# Patient Record
Sex: FEMALE | Race: WHITE | Employment: OTHER | URBAN - METROPOLITAN AREA
[De-identification: names, ages, dates, MRNs, and addresses within clinical notes are randomized per-mention and may not be internally consistent; named-entity substitution may affect disease eponyms.]

---

## 2017-01-04 ENCOUNTER — HOSPITAL ENCOUNTER (OUTPATIENT)
Dept: MAMMOGRAPHY | Age: 62
Discharge: HOME | End: 2017-01-04
Attending: INTERNAL MEDICINE
Payer: MEDICARE

## 2017-01-04 DIAGNOSIS — Z12.31 VISIT FOR SCREENING MAMMOGRAM: ICD-10-CM

## 2017-01-04 PROCEDURE — 77067 SCR MAMMO BI INCL CAD: CPT

## 2017-09-11 ENCOUNTER — TELEPHONE (OUTPATIENT)
Dept: INTERNAL MEDICINE CLINIC | Facility: CLINIC | Age: 62
End: 2017-09-11

## 2017-09-11 NOTE — TELEPHONE ENCOUNTER
Patient is eligible for a Annual Medicare Physical Exam. Please assist patient in scheduling appointment when patient returns call. Thank you. Left message to call back [phone].

## 2017-10-07 ENCOUNTER — TELEPHONE (OUTPATIENT)
Dept: INTERNAL MEDICINE CLINIC | Facility: CLINIC | Age: 62
End: 2017-10-07

## 2017-10-07 NOTE — TELEPHONE ENCOUNTER
Patient is eligible for a Annual Medicare Physical Exam. Please assist patient in scheduling appointment when patient returns call. Thank you. Left message to call back [phone].

## 2017-11-06 ENCOUNTER — TELEPHONE (OUTPATIENT)
Dept: INTERNAL MEDICINE CLINIC | Facility: CLINIC | Age: 62
End: 2017-11-06

## 2017-11-06 NOTE — TELEPHONE ENCOUNTER
Per BruceAMS [facility] from [facility] Dr. Sandra Capellan is not accepting any new patients. [facility] to reschedule appt. Please assist patient in rescheduling appointment scheduled for 11/9/2017. Thank you.

## 2017-11-09 ENCOUNTER — LAB ENCOUNTER (OUTPATIENT)
Dept: LAB | Age: 62
End: 2017-11-09
Attending: INTERNAL MEDICINE
Payer: MEDICARE

## 2017-11-09 ENCOUNTER — OFFICE VISIT (OUTPATIENT)
Dept: INTERNAL MEDICINE CLINIC | Facility: CLINIC | Age: 62
End: 2017-11-09

## 2017-11-09 VITALS
TEMPERATURE: 98 F | DIASTOLIC BLOOD PRESSURE: 79 MMHG | BODY MASS INDEX: 32.94 KG/M2 | HEIGHT: 62 IN | WEIGHT: 179 LBS | SYSTOLIC BLOOD PRESSURE: 125 MMHG | HEART RATE: 72 BPM

## 2017-11-09 DIAGNOSIS — G35 MULTIPLE SCLEROSIS (HCC): ICD-10-CM

## 2017-11-09 DIAGNOSIS — Z00.00 ENCOUNTER FOR ANNUAL HEALTH EXAMINATION: Primary | ICD-10-CM

## 2017-11-09 DIAGNOSIS — Z00.00 ENCOUNTER FOR ANNUAL HEALTH EXAMINATION: ICD-10-CM

## 2017-11-09 DIAGNOSIS — H25.13 AGE-RELATED NUCLEAR CATARACT OF BOTH EYES: ICD-10-CM

## 2017-11-09 DIAGNOSIS — Z13.6 SCREENING FOR CARDIOVASCULAR CONDITION: ICD-10-CM

## 2017-11-09 DIAGNOSIS — Z12.31 VISIT FOR SCREENING MAMMOGRAM: ICD-10-CM

## 2017-11-09 DIAGNOSIS — Z86.69 HISTORY OF OPTIC NEURITIS: ICD-10-CM

## 2017-11-09 PROCEDURE — G0008 ADMIN INFLUENZA VIRUS VAC: HCPCS | Performed by: INTERNAL MEDICINE

## 2017-11-09 PROCEDURE — 84443 ASSAY THYROID STIM HORMONE: CPT

## 2017-11-09 PROCEDURE — 80053 COMPREHEN METABOLIC PANEL: CPT

## 2017-11-09 PROCEDURE — 90686 IIV4 VACC NO PRSV 0.5 ML IM: CPT | Performed by: INTERNAL MEDICINE

## 2017-11-09 PROCEDURE — 80061 LIPID PANEL: CPT

## 2017-11-09 PROCEDURE — 85025 COMPLETE CBC W/AUTO DIFF WBC: CPT

## 2017-11-09 PROCEDURE — G0439 PPPS, SUBSEQ VISIT: HCPCS | Performed by: INTERNAL MEDICINE

## 2017-11-09 PROCEDURE — 36415 COLL VENOUS BLD VENIPUNCTURE: CPT

## 2017-11-09 PROCEDURE — 82306 VITAMIN D 25 HYDROXY: CPT

## 2017-11-09 PROCEDURE — 96160 PT-FOCUSED HLTH RISK ASSMT: CPT | Performed by: INTERNAL MEDICINE

## 2017-11-09 RX ORDER — MODAFINIL 100 MG/1
100 TABLET ORAL
Qty: 90 TABLET | Refills: 1 | Status: SHIPPED | OUTPATIENT
Start: 2017-11-09 | End: 2019-05-08

## 2017-11-09 NOTE — PROGRESS NOTES
HPI:   Kenia Caldwell is a 65year old female who presents for a Medicare Subsequent Annual Wellness visit (Pt already had Initial Annual Wellness).     Pt is a 66 yo woman comes for her medicare annual   Used to see dr Haddad Doing   Needs refill - 3 mn supply a INFORMATION:   She  has a past medical history of Age-related nuclear cataract of both eyes (12/15/2016); Cataract (2008); Disorder of eye, unspecified (2008); History of chicken pox; History of optic neuritis- right eye- June 2008 (12/15/2016);  Lipid scre following:    Height as of this encounter: 5' 2\" (1.575 m). Weight as of this encounter: 179 lb (81.2 kg).      GENERAL: well developed, well nourished,in no apparent distress, not using cane although she states she has one   SKIN: no rashes,no suspicio and friends have told me they think I may have hearing loss:  No            Visual Acuity  Right Eye Visual Acuity: Corrected Right Eye Chart Acuity: 20/25   Left Eye Visual Acuity: Corrected Left Eye Chart Acuity: 20/20   Both Eyes Visual Acuity: Correcte reviewed self breast exam and ordered mammogram   mammogram January 2017 normal colonoscopy  Done by Dr. Fredie Bumpers in 2010 next one in 2020  Flu shot today  Pap smear to be done by Dr. Juan M Arguello last one was 3 years ago-always normal  Labs from 2016 December No    Vision Problems? : No    Difficulty walking?: No    Difficulty dressing or bathing?: No    Problems with daily activities? : No    Memory Problems?: No      Fall/Risk Assessment     Do you have 3 or more medical conditions?: 0-No    Have you fallen i EKG - w/ Initial Preventative Physical Exam only, or if medically necessary Electrocardiogram date12/20/2016       Colorectal Cancer Screening      Colonoscopy Screen every 10 years Colonoscopy,10 Years due on 07/02/2020 Update Health Maintenance if applic No vaccine history found This may be covered with your prescription benefits, but Medicare does not cover unless Medically needed    Zoster  Not covered by Medicare Part B No vaccine history found This may be covered with your pharmacy  prescription paz

## 2017-11-09 NOTE — PATIENT INSTRUCTIONS
Kusum Saavedra's SCREENING SCHEDULE   Tests on this list are recommended by your physician but may not be covered, or covered at this frequency, by your insurer. Please check with your insurance carrier before scheduling to verify coverage.    PREVENTATIVE with a family history    Colorectal Cancer Screening  Covered up to Age 76     Colonoscopy Screen   Covered every 10 years- more often if abnormal Colonoscopy,10 Years due on 07/02/2020 Update Health Maintenance if applicable    Flex Sigmoidoscopy Screen found for this or any previous visit. Please get once after your 65th birthday    Pneumococcal 23 (Pneumovax)  Covered Once after 65 No orders found for this or any previous visit.  Please get once after your 65th birthday    Hepatitis B for Moderate/High R

## 2018-02-27 ENCOUNTER — HOSPITAL ENCOUNTER (OUTPATIENT)
Dept: MAMMOGRAPHY | Age: 63
Discharge: HOME | End: 2018-02-27
Attending: INTERNAL MEDICINE
Payer: MEDICARE

## 2018-02-27 DIAGNOSIS — Z12.31 VISIT FOR SCREENING MAMMOGRAM: ICD-10-CM

## 2018-02-27 PROCEDURE — 77067 SCR MAMMO BI INCL CAD: CPT | Performed by: INTERNAL MEDICINE

## 2018-07-11 ENCOUNTER — OFFICE VISIT (OUTPATIENT)
Dept: NEUROLOGY | Facility: CLINIC | Age: 63
End: 2018-07-11

## 2018-07-11 VITALS
BODY MASS INDEX: 31.94 KG/M2 | HEART RATE: 80 BPM | RESPIRATION RATE: 16 BRPM | SYSTOLIC BLOOD PRESSURE: 130 MMHG | WEIGHT: 178 LBS | HEIGHT: 62.5 IN | DIASTOLIC BLOOD PRESSURE: 78 MMHG

## 2018-07-11 DIAGNOSIS — G35 MULTIPLE SCLEROSIS (HCC): ICD-10-CM

## 2018-07-11 DIAGNOSIS — G82.20 PARAPARESIS (HCC): ICD-10-CM

## 2018-07-11 DIAGNOSIS — R27.0 ATAXIA: Primary | ICD-10-CM

## 2018-07-11 PROCEDURE — 99214 OFFICE O/P EST MOD 30 MIN: CPT | Performed by: OTHER

## 2018-07-11 RX ORDER — MODAFINIL 100 MG/1
100 TABLET ORAL DAILY
Qty: 90 TABLET | Refills: 3 | Status: SHIPPED | OUTPATIENT
Start: 2018-07-11 | End: 2019-09-04

## 2018-07-11 NOTE — PROGRESS NOTES
She believes neurologically she is stable for multiple sclerosis. Lower extremity weakness. She does not wish to go on any immunotherapy for multiple sclerosis.   I discussed the previous MRI scan of the cervical spine reviewed thyroid nodule, she decline

## 2018-07-17 ENCOUNTER — HOSPITAL ENCOUNTER (OUTPATIENT)
Dept: MRI IMAGING | Age: 63
Discharge: HOME | End: 2018-07-17
Attending: Other
Payer: MEDICARE

## 2018-07-17 ENCOUNTER — TELEPHONE (OUTPATIENT)
Dept: NEUROLOGY | Facility: CLINIC | Age: 63
End: 2018-07-17

## 2018-07-17 DIAGNOSIS — G82.20 PARAPARESIS (HCC): ICD-10-CM

## 2018-07-17 DIAGNOSIS — R27.0 ATAXIA: ICD-10-CM

## 2018-07-17 LAB — CREAT BLD-MCNC: 0.6 MG/DL (ref 0.5–1.5)

## 2018-07-17 PROCEDURE — A9576 INJ PROHANCE MULTIPACK: HCPCS | Performed by: OTHER

## 2018-07-17 PROCEDURE — 82565 ASSAY OF CREATININE: CPT

## 2018-07-17 PROCEDURE — 70553 MRI BRAIN STEM W/O & W/DYE: CPT | Performed by: OTHER

## 2018-07-17 PROCEDURE — 72156 MRI NECK SPINE W/O & W/DYE: CPT | Performed by: OTHER

## 2018-07-17 NOTE — TELEPHONE ENCOUNTER
Left MRI results in detailed message on patient's cell number per [facility] verbal release.

## 2018-07-17 NOTE — TELEPHONE ENCOUNTER
Can you please call the patient tell her that MRI of the brain, cervical spine are stable. No new lesions. This is good news.   Thank you

## 2018-07-25 ENCOUNTER — TELEPHONE (OUTPATIENT)
Dept: NEUROLOGY | Facility: CLINIC | Age: 63
End: 2018-07-25

## 2018-08-13 ENCOUNTER — PATIENT OUTREACH (OUTPATIENT)
Dept: CASE MANAGEMENT | Age: 63
End: 2018-08-13

## 2018-09-27 NOTE — PROGRESS NOTES
Second outreached to patient in regards to scheduling Medicare Annual exam (AHA). Left message for patient to return my call at ext. [phone]. Patient is eligible at this time. Thank you.

## 2018-10-19 NOTE — PROGRESS NOTES
Pt is eligible for 2018 Medicare Annual Health Assessment. After multiple attempts to reach patient by phone a letter was sent to patient requesting a call back to discuss.

## 2019-03-12 ENCOUNTER — TELEPHONE (OUTPATIENT)
Dept: CASE MANAGEMENT | Age: 64
End: 2019-03-12

## 2019-03-12 NOTE — TELEPHONE ENCOUNTER
Patient is eligible for a 2019 Annual Medicare Health Assessment. Left message to call back [phone].

## 2019-03-29 ENCOUNTER — TELEPHONE (OUTPATIENT)
Dept: CASE MANAGEMENT | Age: 64
End: 2019-03-29

## 2019-03-29 NOTE — TELEPHONE ENCOUNTER
Patient is eligible for a 2019 Annual Medicare Health Assessment. Left message to call back [phone]. Multiple outreaches to pt. Letter sent.

## 2019-05-08 ENCOUNTER — OFFICE VISIT (OUTPATIENT)
Dept: INTERNAL MEDICINE CLINIC | Facility: CLINIC | Age: 64
End: 2019-05-08
Payer: MEDICARE

## 2019-05-08 VITALS
HEIGHT: 62.5 IN | WEIGHT: 172 LBS | BODY MASS INDEX: 30.86 KG/M2 | DIASTOLIC BLOOD PRESSURE: 80 MMHG | HEART RATE: 76 BPM | SYSTOLIC BLOOD PRESSURE: 130 MMHG

## 2019-05-08 DIAGNOSIS — G82.20 PARAPARESIS (HCC): ICD-10-CM

## 2019-05-08 DIAGNOSIS — H25.13 AGE-RELATED NUCLEAR CATARACT OF BOTH EYES: ICD-10-CM

## 2019-05-08 DIAGNOSIS — G35 MULTIPLE SCLEROSIS (HCC): ICD-10-CM

## 2019-05-08 DIAGNOSIS — Z12.31 SCREENING MAMMOGRAM, ENCOUNTER FOR: ICD-10-CM

## 2019-05-08 DIAGNOSIS — Z11.59 NEED FOR HEPATITIS C SCREENING TEST: ICD-10-CM

## 2019-05-08 DIAGNOSIS — Z01.419 ENCOUNTER FOR ROUTINE GYNECOLOGICAL EXAMINATION WITH PAPANICOLAOU SMEAR OF CERVIX: ICD-10-CM

## 2019-05-08 DIAGNOSIS — Z86.69 HISTORY OF OPTIC NEURITIS: ICD-10-CM

## 2019-05-08 DIAGNOSIS — Z00.00 ENCOUNTER FOR ANNUAL HEALTH EXAMINATION: Primary | ICD-10-CM

## 2019-05-08 DIAGNOSIS — Z91.89 AT RISK FOR DECREASED BONE DENSITY: ICD-10-CM

## 2019-05-08 PROCEDURE — 96160 PT-FOCUSED HLTH RISK ASSMT: CPT | Performed by: INTERNAL MEDICINE

## 2019-05-08 PROCEDURE — 99396 PREV VISIT EST AGE 40-64: CPT | Performed by: INTERNAL MEDICINE

## 2019-05-08 PROCEDURE — G0439 PPPS, SUBSEQ VISIT: HCPCS | Performed by: INTERNAL MEDICINE

## 2019-05-08 NOTE — PROGRESS NOTES
HPI:   Kenia Caldwell is a 62year old female who presents for a MA (Medicare Advantage) Supervisit (Once per calendar year).     Patient is here for her Medicare annual wellness visit she has not been here for a while because  She has been busy with family Use      Smoking status: Former Smoker        Packs/day: 0.30        Years: 15.00        Pack years: 4.5        Types: Cigarettes        Quit date: 2008        Years since quittin.3      Smokeless tobacco: Never Used      Tobacco comment: day she Multiple Vitamins-Minerals (MULTI-VITAMIN/MINERALS) Oral Tab Take 1 tablet by mouth daily.       MEDICAL INFORMATION:   She  has a past medical history of Age-related nuclear cataract of both eyes (12/15/2016), Cataract (2008), Disorder of eye, unspecifie from the following:    Height as of this encounter: 5' 2.5\" (1.588 m). Weight as of this encounter: 172 lb (78 kg).     Medicare Hearing Assessment  (Required for AWV/SWV)    Hearing Screening    Time taken:  5/8/2019 12:57 PM  Screening Method:  Marine Lat sinus tenderness  NECK: supple,no adenopathy, nontender  LUNGS: clear to auscultation, no wheeze  CARDIO: RRR without murmur  GI: good BS's,no masses or tenderness  EXTREMITIES: no cyanosis, or edema  BREAST: Bilateral breasts without any lumps, bilateral in 2020  Pap smear to be done by Dr. Gurvinder Wilde last one was over 3 years ago-would not to be referred to a dr brian Noonan Jesse from 2017 reviewed and ordered new ones      Diet assessment: fair     PLAN:  The patient indicates understanding of these issues and Bone Density Screening      Dexascan Every two years No results found for this or any previous visit. No flowsheet data found.     Pap and Pelvic      Pap: Every 3 yrs age 21-65 or Pap+HPV every 5 yrs age 33-67, age 72 and older at high risk Pap Smear,3

## 2019-05-08 NOTE — PATIENT INSTRUCTIONS
Kusum Saavedra's SCREENING SCHEDULE   Tests on this list are recommended by your physician but may not be covered, or covered at this frequency, by your insurer. Please check with your insurance carrier before scheduling to verify coverage.    PREVENTATIVE Colorectal Cancer Screening  Covered up to Age 76     Colonoscopy Screen   Covered every 10 years- more often if abnormal Colonoscopy due on 07/02/2020 Update Health Maintenance if applicable    Flex Sigmoidoscopy Screen  Covered every 5 years No results Please get once after your 65th birthday    Pneumococcal 23 (Pneumovax)  Covered Once after 65 No orders found for this or any previous visit.  Please get once after your 65th birthday    Hepatitis B for Moderate/High Risk       No orders found for this or

## 2019-06-03 ENCOUNTER — LAB ENCOUNTER (OUTPATIENT)
Dept: LAB | Age: 64
End: 2019-06-03
Attending: INTERNAL MEDICINE
Payer: MEDICARE

## 2019-06-03 DIAGNOSIS — Z11.59 NEED FOR HEPATITIS C SCREENING TEST: ICD-10-CM

## 2019-06-03 DIAGNOSIS — Z00.00 ENCOUNTER FOR ANNUAL HEALTH EXAMINATION: ICD-10-CM

## 2019-06-03 PROCEDURE — 84443 ASSAY THYROID STIM HORMONE: CPT

## 2019-06-03 PROCEDURE — 85025 COMPLETE CBC W/AUTO DIFF WBC: CPT

## 2019-06-03 PROCEDURE — 80053 COMPREHEN METABOLIC PANEL: CPT

## 2019-06-03 PROCEDURE — 86803 HEPATITIS C AB TEST: CPT

## 2019-06-03 PROCEDURE — 80061 LIPID PANEL: CPT

## 2019-06-03 PROCEDURE — 36415 COLL VENOUS BLD VENIPUNCTURE: CPT

## 2019-06-13 ENCOUNTER — HOSPITAL ENCOUNTER (OUTPATIENT)
Dept: MAMMOGRAPHY | Age: 64
Discharge: HOME | End: 2019-06-13
Attending: INTERNAL MEDICINE
Payer: MEDICARE

## 2019-06-13 ENCOUNTER — HOSPITAL ENCOUNTER (OUTPATIENT)
Dept: BONE DENSITY | Age: 64
Discharge: HOME | End: 2019-06-13
Attending: INTERNAL MEDICINE
Payer: MEDICARE

## 2019-06-13 DIAGNOSIS — Z91.89 AT RISK FOR DECREASED BONE DENSITY: ICD-10-CM

## 2019-06-13 DIAGNOSIS — Z12.31 SCREENING MAMMOGRAM, ENCOUNTER FOR: ICD-10-CM

## 2019-06-13 PROCEDURE — 77063 BREAST TOMOSYNTHESIS BI: CPT | Performed by: INTERNAL MEDICINE

## 2019-06-13 PROCEDURE — 77080 DXA BONE DENSITY AXIAL: CPT | Performed by: INTERNAL MEDICINE

## 2019-06-13 PROCEDURE — 77067 SCR MAMMO BI INCL CAD: CPT | Performed by: INTERNAL MEDICINE

## 2019-06-15 DIAGNOSIS — Z80.3 FAMILY HISTORY OF BREAST CANCER IN FIRST DEGREE RELATIVE: Primary | ICD-10-CM

## 2019-06-21 ENCOUNTER — TELEPHONE (OUTPATIENT)
Dept: HEMATOLOGY/ONCOLOGY | Facility: HOSPITAL | Age: 64
End: 2019-06-21

## 2019-07-01 ENCOUNTER — HOSPITAL ENCOUNTER (OUTPATIENT)
Dept: MAMMOGRAPHY | Facility: HOSPITAL | Age: 64
Discharge: HOME | End: 2019-07-01
Attending: INTERNAL MEDICINE
Payer: MEDICARE

## 2019-07-01 DIAGNOSIS — R92.8 ABNORMAL MAMMOGRAM: ICD-10-CM

## 2019-07-01 PROCEDURE — 77065 DX MAMMO INCL CAD UNI: CPT | Performed by: INTERNAL MEDICINE

## 2019-07-01 PROCEDURE — 77061 BREAST TOMOSYNTHESIS UNI: CPT | Performed by: INTERNAL MEDICINE

## 2019-07-26 ENCOUNTER — TELEPHONE (OUTPATIENT)
Dept: HEMATOLOGY/ONCOLOGY | Facility: HOSPITAL | Age: 64
End: 2019-07-26

## 2019-08-29 ENCOUNTER — OFFICE VISIT (OUTPATIENT)
Dept: OBGYN CLINIC | Facility: CLINIC | Age: 64
End: 2019-08-29

## 2019-08-29 VITALS
SYSTOLIC BLOOD PRESSURE: 131 MMHG | BODY MASS INDEX: 31.58 KG/M2 | HEIGHT: 62.5 IN | DIASTOLIC BLOOD PRESSURE: 86 MMHG | HEART RATE: 80 BPM | WEIGHT: 176 LBS

## 2019-08-29 DIAGNOSIS — Z01.419 ENCOUNTER FOR GYNECOLOGICAL EXAMINATION WITHOUT ABNORMAL FINDING: Primary | ICD-10-CM

## 2019-08-29 PROCEDURE — G0101 CA SCREEN;PELVIC/BREAST EXAM: HCPCS | Performed by: OBSTETRICS & GYNECOLOGY

## 2019-08-29 NOTE — PROGRESS NOTES
Sean Phillips is a 62year old female  No LMP recorded. (Menstrual status: Menopause). who presents for Patient presents with:  Gyn Exam: NP, Annual    She has no complaints. She has not had intercourse in over 7 years.   We discussed ASCCP guideline and Sexual Activity      Alcohol use: Yes        Comment: 2-3 glasses of wine/week      Drug use: No      Sexual activity: Not on file    Lifestyle      Physical activity:        Days per week: Not on file        Minutes per session: Not on file      Stres Carbonate-Vitamin D (CALTRATE 600+D OR), Take 1 tablet by mouth 2 (two) times daily. , Disp: , Rfl:   •  Biotin 2.5 MG Oral Tab, Take 1 tablet by mouth.  , Disp: , Rfl:   •  Cholecalciferol (VITAMIN D3) 2000 UNITS Oral Tab, Take 1 tablet by mouth.  , Disp: Oriented to time, place, person and situation.  Appropriate mood and affect    Pelvic Exam:  External Genitalia: normal appearance, hair distribution, and no lesions  Urethral Meatus:  normal in size, location, without lesions and prolapse  Bladder:  No fu

## 2019-08-30 LAB — HPV I/H RISK 1 DNA SPEC QL NAA+PROBE: POSITIVE

## 2019-09-04 ENCOUNTER — OFFICE VISIT (OUTPATIENT)
Dept: NEUROLOGY | Facility: CLINIC | Age: 64
End: 2019-09-04
Payer: MEDICARE

## 2019-09-04 VITALS
HEIGHT: 62 IN | SYSTOLIC BLOOD PRESSURE: 132 MMHG | DIASTOLIC BLOOD PRESSURE: 86 MMHG | WEIGHT: 174 LBS | BODY MASS INDEX: 32.02 KG/M2

## 2019-09-04 DIAGNOSIS — G35 MULTIPLE SCLEROSIS (HCC): ICD-10-CM

## 2019-09-04 DIAGNOSIS — D51.8 OTHER VITAMIN B12 DEFICIENCY ANEMIA: Primary | ICD-10-CM

## 2019-09-04 PROCEDURE — 99213 OFFICE O/P EST LOW 20 MIN: CPT | Performed by: OTHER

## 2019-09-04 RX ORDER — MODAFINIL 100 MG/1
100 TABLET ORAL DAILY
Qty: 90 TABLET | Refills: 1 | Status: SHIPPED | OUTPATIENT
Start: 2019-09-04 | End: 2020-09-15

## 2019-09-04 NOTE — PROGRESS NOTES
Ms Hamlet Sabina, relates no new neurological symptoms. During a gyne procedure developed low back pain. This is improved. She denies lower extremity radicular pain.   She relates she will follow-up with her ophthalmologist.    MRI reports of that cervical spin

## 2019-09-05 ENCOUNTER — TELEPHONE (OUTPATIENT)
Dept: NEUROLOGY | Facility: CLINIC | Age: 64
End: 2019-09-05

## 2019-09-05 ENCOUNTER — APPOINTMENT (OUTPATIENT)
Dept: LAB | Age: 64
End: 2019-09-05
Attending: Other
Payer: MEDICARE

## 2019-09-05 DIAGNOSIS — D51.8 OTHER VITAMIN B12 DEFICIENCY ANEMIA: ICD-10-CM

## 2019-09-05 LAB
HPV16 DNA CVX QL PROBE+SIG AMP: POSITIVE
HPV18 DNA CVX QL PROBE+SIG AMP: NEGATIVE

## 2019-09-05 PROCEDURE — 82306 VITAMIN D 25 HYDROXY: CPT

## 2019-09-05 PROCEDURE — 82607 VITAMIN B-12: CPT

## 2019-09-05 PROCEDURE — 36415 COLL VENOUS BLD VENIPUNCTURE: CPT

## 2019-09-05 NOTE — TELEPHONE ENCOUNTER
[employer] mailorder calling to ask questions for PA for Modafinal.    PA questions answered and determination will be faxed to the office

## 2019-09-11 ENCOUNTER — MED REC SCAN ONLY (OUTPATIENT)
Dept: NEUROLOGY | Facility: CLINIC | Age: 64
End: 2019-09-11

## 2019-09-18 ENCOUNTER — TELEPHONE (OUTPATIENT)
Dept: OBGYN CLINIC | Facility: CLINIC | Age: 64
End: 2019-09-18

## 2019-09-18 NOTE — TELEPHONE ENCOUNTER
----- Message from Jaquan Wright MD sent at 9/11/2019  6:34 PM CDT -----  HPV 16 positive- please schedule colposcopy and explain procedure

## 2019-09-19 NOTE — TELEPHONE ENCOUNTER
Informed pt of results and CAP recs below. Pt became very tearful on the phone. Pt states   \"that pap smear was the most difficult and very painful and so uncomfortable\". Pt states \"I can't go through that again. Dr. Matthew Ross will understand\".  Pt asking to

## 2019-09-20 NOTE — TELEPHONE ENCOUNTER
Notified pt of CAP's recs below. Pt states she does not have anyone to drive her. Pt will think about it and call us back. Staff message sent to f/u.

## 2019-09-20 NOTE — TELEPHONE ENCOUNTER
Unfortunately I cannot change the recs based on discomfort- I have to follow the ASCCP guidelines for her abnormal pap. We can try to clear with her insurance to have the colpo done under anesthesia but insurance may not be willing to cover this.   We can

## 2019-10-17 ENCOUNTER — OFFICE VISIT (OUTPATIENT)
Dept: OPHTHALMOLOGY | Facility: CLINIC | Age: 64
End: 2019-10-17
Payer: MEDICARE

## 2019-10-17 DIAGNOSIS — Z86.69 HISTORY OF OPTIC NEURITIS: ICD-10-CM

## 2019-10-17 DIAGNOSIS — H43.393 FLOATER, VITREOUS, BILATERAL: ICD-10-CM

## 2019-10-17 DIAGNOSIS — H25.13 AGE-RELATED NUCLEAR CATARACT OF BOTH EYES: Primary | ICD-10-CM

## 2019-10-17 DIAGNOSIS — G35 MULTIPLE SCLEROSIS (HCC): ICD-10-CM

## 2019-10-17 PROCEDURE — 92015 DETERMINE REFRACTIVE STATE: CPT | Performed by: OPHTHALMOLOGY

## 2019-10-17 PROCEDURE — 92014 COMPRE OPH EXAM EST PT 1/>: CPT | Performed by: OPHTHALMOLOGY

## 2019-10-17 NOTE — ASSESSMENT & PLAN NOTE
Discussed moderate cataracts with patient. No treatment recommended at this time. New glasses today; update as needed. Discussed that new prescription is only a slight change.

## 2019-10-17 NOTE — PATIENT INSTRUCTIONS
History of optic neuritis- right eye- June 2008  Patient had optic neuritis in the right eye in 2008. Age-related nuclear cataract of both eyes  Discussed moderate cataracts with patient. No treatment recommended at this time.  New glasses today; updat

## 2019-10-17 NOTE — PROGRESS NOTES
Lacretibryant Ny is a 62year old female. HPI:     HPI     Pt complains of blurred vision OU at distance and near with her glasses and would like an updated Rx. Pt has no eye pain and is not using any eye drops. Dx with MS in 2008.      Last edited by [employer] MG Oral Tab, Take 1 tablet by mouth.  , Disp: , Rfl:   Cholecalciferol (VITAMIN D3) 2000 UNITS Oral Tab, Take 1 tablet by mouth.  , Disp: , Rfl:   Omega-3 Fatty Acids (FISH OIL OR), Take 1 capsule by mouth.  , Disp: , Rfl:   Multiple Vitamins-Minerals (MUL +0.50 130 1.75    Type:  Progressive bifocal          Manifest Refraction       Sphere Cylinder [place] Add [place]    Right -0.50 +1.00 180 20/25+ +2.25 20/20    Left +0.50 +0.50 130 20/20 +2.25 20/20          Final Rx       Sphere Cylinder Axis Dist

## 2020-02-03 ENCOUNTER — TELEPHONE (OUTPATIENT)
Dept: CASE MANAGEMENT | Age: 65
End: 2020-02-03

## 2020-02-28 ENCOUNTER — MA CHART PREP (OUTPATIENT)
Dept: FAMILY MEDICINE CLINIC | Facility: CLINIC | Age: 65
End: 2020-02-28

## 2020-02-28 PROBLEM — Z87.891 PAST USE OF TOBACCO: Status: ACTIVE | Noted: 2020-02-28

## 2020-02-28 PROBLEM — E04.1 THYROID NODULE: Status: ACTIVE | Noted: 2020-02-28

## 2020-02-28 PROBLEM — M48.02 NEURAL FORAMINAL STENOSIS OF CERVICAL SPINE: Status: ACTIVE | Noted: 2020-02-28

## 2020-03-02 ENCOUNTER — LAB ENCOUNTER (OUTPATIENT)
Dept: LAB | Age: 65
End: 2020-03-02
Attending: INTERNAL MEDICINE
Payer: MEDICARE

## 2020-03-02 ENCOUNTER — OFFICE VISIT (OUTPATIENT)
Dept: INTERNAL MEDICINE CLINIC | Facility: CLINIC | Age: 65
End: 2020-03-02
Payer: MEDICARE

## 2020-03-02 VITALS
HEIGHT: 62 IN | BODY MASS INDEX: 30.73 KG/M2 | HEART RATE: 67 BPM | WEIGHT: 167 LBS | SYSTOLIC BLOOD PRESSURE: 132 MMHG | DIASTOLIC BLOOD PRESSURE: 88 MMHG

## 2020-03-02 DIAGNOSIS — M81.0 AGE-RELATED OSTEOPOROSIS WITHOUT CURRENT PATHOLOGICAL FRACTURE: ICD-10-CM

## 2020-03-02 DIAGNOSIS — Z00.00 ENCOUNTER FOR ANNUAL HEALTH EXAMINATION: ICD-10-CM

## 2020-03-02 DIAGNOSIS — Z86.69 HISTORY OF OPTIC NEURITIS: ICD-10-CM

## 2020-03-02 DIAGNOSIS — G35 MULTIPLE SCLEROSIS (HCC): ICD-10-CM

## 2020-03-02 DIAGNOSIS — G82.20 PARAPARESIS (HCC): ICD-10-CM

## 2020-03-02 DIAGNOSIS — R87.618 ABNORMAL PAPANICOLAOU SMEAR OF CERVIX WITH POSITIVE HUMAN PAPILLOMA VIRUS (HPV) TEST: ICD-10-CM

## 2020-03-02 DIAGNOSIS — E04.1 THYROID NODULE: ICD-10-CM

## 2020-03-02 DIAGNOSIS — Z12.31 VISIT FOR SCREENING MAMMOGRAM: ICD-10-CM

## 2020-03-02 DIAGNOSIS — M48.02 NEURAL FORAMINAL STENOSIS OF CERVICAL SPINE: ICD-10-CM

## 2020-03-02 DIAGNOSIS — Z00.00 ENCOUNTER FOR ANNUAL HEALTH EXAMINATION: Primary | ICD-10-CM

## 2020-03-02 DIAGNOSIS — Z12.11 COLON CANCER SCREENING: ICD-10-CM

## 2020-03-02 DIAGNOSIS — H25.13 AGE-RELATED NUCLEAR CATARACT OF BOTH EYES: ICD-10-CM

## 2020-03-02 DIAGNOSIS — H43.393 FLOATER, VITREOUS, BILATERAL: ICD-10-CM

## 2020-03-02 DIAGNOSIS — Z87.891 PAST USE OF TOBACCO: ICD-10-CM

## 2020-03-02 DIAGNOSIS — Z80.3 FH: BREAST CANCER IN FIRST DEGREE RELATIVE WHEN <50 YEARS OLD: ICD-10-CM

## 2020-03-02 LAB
ALBUMIN SERPL-MCNC: 3.8 G/DL (ref 3.4–5)
ALBUMIN/GLOB SERPL: 1.3 {RATIO} (ref 1–2)
ALP LIVER SERPL-CCNC: 67 U/L (ref 50–130)
ALT SERPL-CCNC: 24 U/L (ref 13–56)
ANION GAP SERPL CALC-SCNC: 5 MMOL/L (ref 0–18)
AST SERPL-CCNC: 17 U/L (ref 15–37)
BILIRUB SERPL-MCNC: 0.6 MG/DL (ref 0.1–2)
BUN BLD-MCNC: 10 MG/DL (ref 7–18)
BUN/CREAT SERPL: 15.9 (ref 10–20)
CALCIUM BLD-MCNC: 9.3 MG/DL (ref 8.5–10.1)
CHLORIDE SERPL-SCNC: 108 MMOL/L (ref 98–112)
CHOLEST SMN-MCNC: 226 MG/DL (ref ?–200)
CO2 SERPL-SCNC: 28 MMOL/L (ref 21–32)
CREAT BLD-MCNC: 0.63 MG/DL (ref 0.55–1.02)
GLOBULIN PLAS-MCNC: 3 G/DL (ref 2.8–4.4)
GLUCOSE BLD-MCNC: 85 MG/DL (ref 70–99)
HDLC SERPL-MCNC: 66 MG/DL (ref 40–59)
LDLC SERPL CALC-MCNC: 140 MG/DL (ref ?–100)
M PROTEIN MFR SERPL ELPH: 6.8 G/DL (ref 6.4–8.2)
NONHDLC SERPL-MCNC: 160 MG/DL (ref ?–130)
OSMOLALITY SERPL CALC.SUM OF ELEC: 290 MOSM/KG (ref 275–295)
PATIENT FASTING Y/N/NP: YES
PATIENT FASTING Y/N/NP: YES
POTASSIUM SERPL-SCNC: 4.7 MMOL/L (ref 3.5–5.1)
SODIUM SERPL-SCNC: 141 MMOL/L (ref 136–145)
TRIGL SERPL-MCNC: 98 MG/DL (ref 30–149)
VLDLC SERPL CALC-MCNC: 20 MG/DL (ref 0–30)

## 2020-03-02 PROCEDURE — 99396 PREV VISIT EST AGE 40-64: CPT | Performed by: INTERNAL MEDICINE

## 2020-03-02 PROCEDURE — G0439 PPPS, SUBSEQ VISIT: HCPCS | Performed by: INTERNAL MEDICINE

## 2020-03-02 PROCEDURE — G0008 ADMIN INFLUENZA VIRUS VAC: HCPCS | Performed by: INTERNAL MEDICINE

## 2020-03-02 PROCEDURE — 96160 PT-FOCUSED HLTH RISK ASSMT: CPT | Performed by: INTERNAL MEDICINE

## 2020-03-02 PROCEDURE — 80061 LIPID PANEL: CPT

## 2020-03-02 PROCEDURE — 36415 COLL VENOUS BLD VENIPUNCTURE: CPT

## 2020-03-02 PROCEDURE — 80053 COMPREHEN METABOLIC PANEL: CPT

## 2020-03-02 PROCEDURE — 90686 IIV4 VACC NO PRSV 0.5 ML IM: CPT | Performed by: INTERNAL MEDICINE

## 2020-03-02 RX ORDER — ALENDRONATE SODIUM 70 MG/1
70 TABLET ORAL WEEKLY
Qty: 12 TABLET | Refills: 3 | Status: SHIPPED | OUTPATIENT
Start: 2020-03-02 | End: 2021-01-18

## 2020-03-02 NOTE — PATIENT INSTRUCTIONS
Kusum Saavedra's SCREENING SCHEDULE   Tests on this list are recommended by your physician but may not be covered, or covered at this frequency, by your insurer. Please check with your insurance carrier before scheduling to verify coverage.    PREVENTATIVE abnormal Colonoscopy due on 07/02/2020 Update [facility] if applicable    Flex Sigmoidoscopy Screen  Covered every 5 years No results found for this or any previous visit. No flowsheet data found.      Fecal Occult Blood   Covered Annually No result No orders found for this or any previous visit. Please get once after your 65th birthday    Hepatitis B for Moderate/High Risk       No orders found for this or any previous visit.  Medium/high risk factors:   End-stage renal disease   Hemophiliacs who rece

## 2020-03-02 NOTE — PROGRESS NOTES
HPI:   Bienvenido Carter is a 60year old female who presents for a MA (Medicare Advantage) Supervisit (Once per calendar year).     Patient is here for her Medicare physical  No complaints but has questions needs referral to see neurology and blood work-    H smoked tobacco in the past but quit greater than 12 months ago.   Social History    Tobacco Use      Smoking status: Former Smoker        Packs/day: 0.30        Years: 15.00        Pack years: 4.5        Types: Cigarettes        Quit date: 1/1/2008        Jennifer Guzman mouth once a week.  modafinil 100 MG Oral Tab, Take 1 tablet (100 mg total) by mouth daily. aspirin 81 MG Oral Tab, Take 81 mg by mouth daily. Calcium Carbonate-Vitamin D (CALTRATE 600+D OR), Take 1 tablet by mouth 2 (two) times daily.   Biotin 2.5 MG O incontinence   MUSCULOSKELETAL: denies back pain-- chronic -- has been moving   NEURO: denies headaches  PSYCHE: denies depression or anxiety  HEMATOLOGIC: denies hx of anemia  ENDOCRINE: denies thyroid history  ALL/ASTHMA: denies hx of allergy or asthma testing is covered by her insurance–she has the number and the order already  Past use of tobacco  Quit times many years  Paraparesis ([facility] [address]    Multiple sclerosis ([facility] [address]  -     NEURO - INTERNAL  Will refer to neurology  Thyroid nodule  -     US THYROID (CPT= maintain positive mental well-being?: (P) Social Interaction      This section provided for quick review of chart, separate sheet to patient  [address] Internal Lab or Procedure External Lab or Procedure   Diabetes Scr Pneumococcal 23 (Pneumovax)  Covered Once after 65 No vaccine history found Please get once after your 65th birthday    Hepatitis B for Moderate/High Risk No vaccine history found Medium/high risk factors:   End-stage renal disease   Hemophiliacs who recei

## 2020-07-21 ENCOUNTER — MED REC SCAN ONLY (OUTPATIENT)
Dept: INTERNAL MEDICINE CLINIC | Facility: CLINIC | Age: 65
End: 2020-07-21

## 2020-08-20 ENCOUNTER — HOSPITAL ENCOUNTER (OUTPATIENT)
Dept: MAMMOGRAPHY | Age: 65
Discharge: HOME | End: 2020-08-20
Attending: INTERNAL MEDICINE
Payer: MEDICARE

## 2020-08-20 DIAGNOSIS — Z12.31 VISIT FOR SCREENING MAMMOGRAM: ICD-10-CM

## 2020-08-20 PROCEDURE — 77067 SCR MAMMO BI INCL CAD: CPT | Performed by: INTERNAL MEDICINE

## 2020-08-20 PROCEDURE — 77063 BREAST TOMOSYNTHESIS BI: CPT | Performed by: INTERNAL MEDICINE

## 2020-09-08 ENCOUNTER — OFFICE VISIT (OUTPATIENT)
Dept: INTERNAL MEDICINE CLINIC | Facility: CLINIC | Age: 65
End: 2020-09-08
Payer: MEDICARE

## 2020-09-08 VITALS
SYSTOLIC BLOOD PRESSURE: 137 MMHG | DIASTOLIC BLOOD PRESSURE: 86 MMHG | WEIGHT: 170.63 LBS | HEART RATE: 69 BPM | TEMPERATURE: 97 F | BODY MASS INDEX: 31.4 KG/M2 | HEIGHT: 62 IN

## 2020-09-08 DIAGNOSIS — G35 MULTIPLE SCLEROSIS (HCC): Primary | ICD-10-CM

## 2020-09-08 DIAGNOSIS — R87.629 ABNORMAL VAGINAL PAP SMEAR: ICD-10-CM

## 2020-09-08 DIAGNOSIS — M81.0 AGE-RELATED OSTEOPOROSIS WITHOUT CURRENT PATHOLOGICAL FRACTURE: ICD-10-CM

## 2020-09-08 PROCEDURE — 99213 OFFICE O/P EST LOW 20 MIN: CPT | Performed by: INTERNAL MEDICINE

## 2020-09-08 PROCEDURE — 3079F DIAST BP 80-89 MM HG: CPT | Performed by: INTERNAL MEDICINE

## 2020-09-08 PROCEDURE — 3008F BODY MASS INDEX DOCD: CPT | Performed by: INTERNAL MEDICINE

## 2020-09-08 PROCEDURE — 3075F SYST BP GE 130 - 139MM HG: CPT | Performed by: INTERNAL MEDICINE

## 2020-09-08 NOTE — PROGRESS NOTES
Maricel Enciso is a 60year old female. Patient presents with: Follow - Up: 6m f/u      HPI:   Pt comes for f/u  C/c f/u  C/o hasnt been talking to her family -- \"I always have to be the one to reach out .  I have 2 girlfriends that talk to \"  No kids both eyes 12/15/2016   • Cataract 2008    OU   • Disorder of eye, unspecified 2008    altitudinal VF defect - OD; resolved 1/6/09   • History of chicken pox     /measles/mumps   • History of optic neuritis- right eye- June 2008 12/15/2016   • Multiple scle this visit:    Multiple sclerosis ([facility] [address]  F/u with neuro   Will check labs   Age-related osteoporosis without current pathological fracture  Cont with meds   Abnormal vaginal Pap smear  -     OBG - INTERNAL  will refer        Preventative medicine   reviewe

## 2020-09-15 ENCOUNTER — OFFICE VISIT (OUTPATIENT)
Dept: NEUROLOGY | Facility: CLINIC | Age: 65
End: 2020-09-15
Payer: MEDICARE

## 2020-09-15 VITALS
HEIGHT: 62 IN | DIASTOLIC BLOOD PRESSURE: 76 MMHG | WEIGHT: 172 LBS | SYSTOLIC BLOOD PRESSURE: 126 MMHG | BODY MASS INDEX: 31.65 KG/M2

## 2020-09-15 DIAGNOSIS — G35 MULTIPLE SCLEROSIS (HCC): Primary | ICD-10-CM

## 2020-09-15 PROCEDURE — 99213 OFFICE O/P EST LOW 20 MIN: CPT | Performed by: OTHER

## 2020-09-15 PROCEDURE — 3078F DIAST BP <80 MM HG: CPT | Performed by: OTHER

## 2020-09-15 PROCEDURE — 3008F BODY MASS INDEX DOCD: CPT | Performed by: OTHER

## 2020-09-15 PROCEDURE — 3074F SYST BP LT 130 MM HG: CPT | Performed by: OTHER

## 2020-09-15 RX ORDER — MODAFINIL 100 MG/1
100 TABLET ORAL DAILY
Qty: 90 TABLET | Refills: 1 | Status: SHIPPED | OUTPATIENT
Start: 2020-09-15

## 2020-09-15 NOTE — PROGRESS NOTES
Modafinil prescription called into [facility] at [facility] in [facility] per pt request. Printed prescription has been shredded.

## 2020-09-15 NOTE — PROGRESS NOTES
Ms Monica Cavazos, relates that she feels great. No trouble with balance. She is moved into assisted living, [facility] [place]. She denies headache, neck pain, low back pain. No focal motor, sensory symptoms in the arms or legs.     Review of systems -12 ye

## 2020-10-26 ENCOUNTER — MED REC SCAN ONLY (OUTPATIENT)
Dept: INTERNAL MEDICINE CLINIC | Facility: CLINIC | Age: 65
End: 2020-10-26

## 2020-10-26 ENCOUNTER — IMMUNIZATION (OUTPATIENT)
Dept: INTERNAL MEDICINE CLINIC | Facility: CLINIC | Age: 65
End: 2020-10-26
Payer: MEDICARE

## 2020-10-26 DIAGNOSIS — Z23 NEED FOR VACCINATION: ICD-10-CM

## 2020-10-26 PROCEDURE — G0008 ADMIN INFLUENZA VIRUS VAC: HCPCS | Performed by: INTERNAL MEDICINE

## 2020-10-26 PROCEDURE — 90686 IIV4 VACC NO PRSV 0.5 ML IM: CPT | Performed by: INTERNAL MEDICINE

## 2021-01-04 ENCOUNTER — LAB ENCOUNTER (OUTPATIENT)
Dept: LAB | Age: 66
End: 2021-01-04
Attending: INTERNAL MEDICINE
Payer: MEDICARE

## 2021-01-04 DIAGNOSIS — M81.0 AGE-RELATED OSTEOPOROSIS WITHOUT CURRENT PATHOLOGICAL FRACTURE: ICD-10-CM

## 2021-01-04 DIAGNOSIS — G35 MULTIPLE SCLEROSIS (HCC): ICD-10-CM

## 2021-01-04 LAB
ALBUMIN SERPL-MCNC: 3.6 G/DL (ref 3.4–5)
ALBUMIN/GLOB SERPL: 1.1 {RATIO} (ref 1–2)
ALP LIVER SERPL-CCNC: 54 U/L
ALT SERPL-CCNC: 18 U/L
ANION GAP SERPL CALC-SCNC: 5 MMOL/L (ref 0–18)
AST SERPL-CCNC: 18 U/L (ref 15–37)
BASOPHILS # BLD AUTO: 0.04 X10(3) UL (ref 0–0.2)
BASOPHILS NFR BLD AUTO: 0.8 %
BILIRUB SERPL-MCNC: 0.7 MG/DL (ref 0.1–2)
BUN BLD-MCNC: 13 MG/DL (ref 7–18)
BUN/CREAT SERPL: 18.1 (ref 10–20)
CALCIUM BLD-MCNC: 9 MG/DL (ref 8.5–10.1)
CHLORIDE SERPL-SCNC: 108 MMOL/L (ref 98–112)
CO2 SERPL-SCNC: 27 MMOL/L (ref 21–32)
CREAT BLD-MCNC: 0.72 MG/DL
DEPRECATED RDW RBC AUTO: 43.8 FL (ref 35.1–46.3)
EOSINOPHIL # BLD AUTO: 0.15 X10(3) UL (ref 0–0.7)
EOSINOPHIL NFR BLD AUTO: 3.2 %
ERYTHROCYTE [DISTWIDTH] IN BLOOD BY AUTOMATED COUNT: 12.4 % (ref 11–15)
GLOBULIN PLAS-MCNC: 3.2 G/DL (ref 2.8–4.4)
GLUCOSE BLD-MCNC: 78 MG/DL (ref 70–99)
HCT VFR BLD AUTO: 43.8 %
HGB BLD-MCNC: 14.6 G/DL
IMM GRANULOCYTES # BLD AUTO: 0.01 X10(3) UL (ref 0–1)
IMM GRANULOCYTES NFR BLD: 0.2 %
LYMPHOCYTES # BLD AUTO: 1.97 X10(3) UL (ref 1–4)
LYMPHOCYTES NFR BLD AUTO: 41.5 %
M PROTEIN MFR SERPL ELPH: 6.8 G/DL (ref 6.4–8.2)
MCH RBC QN AUTO: 31.9 PG (ref 26–34)
MCHC RBC AUTO-ENTMCNC: 33.3 G/DL (ref 31–37)
MCV RBC AUTO: 95.6 FL
MONOCYTES # BLD AUTO: 0.34 X10(3) UL (ref 0.1–1)
MONOCYTES NFR BLD AUTO: 7.2 %
NEUTROPHILS # BLD AUTO: 2.24 X10 (3) UL (ref 1.5–7.7)
NEUTROPHILS # BLD AUTO: 2.24 X10(3) UL (ref 1.5–7.7)
NEUTROPHILS NFR BLD AUTO: 47.1 %
OSMOLALITY SERPL CALC.SUM OF ELEC: 289 MOSM/KG (ref 275–295)
PATIENT FASTING Y/N/NP: YES
PLATELET # BLD AUTO: 233 10(3)UL (ref 150–450)
POTASSIUM SERPL-SCNC: 3.7 MMOL/L (ref 3.5–5.1)
RBC # BLD AUTO: 4.58 X10(6)UL
SODIUM SERPL-SCNC: 140 MMOL/L (ref 136–145)
TSI SER-ACNC: 2.45 MIU/ML (ref 0.36–3.74)
WBC # BLD AUTO: 4.8 X10(3) UL (ref 4–11)

## 2021-01-04 PROCEDURE — 85025 COMPLETE CBC W/AUTO DIFF WBC: CPT

## 2021-01-04 PROCEDURE — 36415 COLL VENOUS BLD VENIPUNCTURE: CPT

## 2021-01-04 PROCEDURE — 80053 COMPREHEN METABOLIC PANEL: CPT

## 2021-01-04 PROCEDURE — 84443 ASSAY THYROID STIM HORMONE: CPT

## 2021-01-17 DIAGNOSIS — M81.0 AGE-RELATED OSTEOPOROSIS WITHOUT CURRENT PATHOLOGICAL FRACTURE: ICD-10-CM

## 2021-01-18 RX ORDER — ALENDRONATE SODIUM 70 MG/1
70 TABLET ORAL WEEKLY
Qty: 12 TABLET | Refills: 0 | Status: SHIPPED | OUTPATIENT
Start: 2021-01-18 | End: 2021-08-24

## 2021-03-12 DIAGNOSIS — Z23 NEED FOR VACCINATION: ICD-10-CM

## 2021-03-15 ENCOUNTER — MA CHART PREP (OUTPATIENT)
Dept: FAMILY MEDICINE CLINIC | Facility: CLINIC | Age: 66
End: 2021-03-15

## 2021-03-16 ENCOUNTER — OFFICE VISIT (OUTPATIENT)
Dept: INTERNAL MEDICINE CLINIC | Facility: CLINIC | Age: 66
End: 2021-03-16
Payer: MEDICARE

## 2021-03-16 VITALS
HEART RATE: 77 BPM | BODY MASS INDEX: 31.65 KG/M2 | SYSTOLIC BLOOD PRESSURE: 129 MMHG | DIASTOLIC BLOOD PRESSURE: 86 MMHG | RESPIRATION RATE: 17 BRPM | HEIGHT: 62 IN | WEIGHT: 172 LBS

## 2021-03-16 DIAGNOSIS — E04.1 THYROID NODULE: ICD-10-CM

## 2021-03-16 DIAGNOSIS — Z80.3 FH: BREAST CANCER IN FIRST DEGREE RELATIVE WHEN <50 YEARS OLD: ICD-10-CM

## 2021-03-16 DIAGNOSIS — Z86.69 HISTORY OF OPTIC NEURITIS: ICD-10-CM

## 2021-03-16 DIAGNOSIS — Z23 NEED FOR VACCINATION: ICD-10-CM

## 2021-03-16 DIAGNOSIS — Z87.891 PAST USE OF TOBACCO: ICD-10-CM

## 2021-03-16 DIAGNOSIS — R87.618 ABNORMAL PAPANICOLAOU SMEAR OF CERVIX WITH POSITIVE HUMAN PAPILLOMA VIRUS (HPV) TEST: ICD-10-CM

## 2021-03-16 DIAGNOSIS — Z00.00 ENCOUNTER FOR ANNUAL HEALTH EXAMINATION: Primary | ICD-10-CM

## 2021-03-16 DIAGNOSIS — M48.02 NEURAL FORAMINAL STENOSIS OF CERVICAL SPINE: ICD-10-CM

## 2021-03-16 DIAGNOSIS — H43.393 FLOATER, VITREOUS, BILATERAL: ICD-10-CM

## 2021-03-16 DIAGNOSIS — H25.13 AGE-RELATED NUCLEAR CATARACT OF BOTH EYES: ICD-10-CM

## 2021-03-16 DIAGNOSIS — Z12.11 COLON CANCER SCREENING: ICD-10-CM

## 2021-03-16 DIAGNOSIS — G82.20 PARAPARESIS (HCC): ICD-10-CM

## 2021-03-16 DIAGNOSIS — G35 MULTIPLE SCLEROSIS (HCC): ICD-10-CM

## 2021-03-16 DIAGNOSIS — M81.0 AGE-RELATED OSTEOPOROSIS WITHOUT CURRENT PATHOLOGICAL FRACTURE: ICD-10-CM

## 2021-03-16 DIAGNOSIS — Z12.31 SCREENING MAMMOGRAM, ENCOUNTER FOR: ICD-10-CM

## 2021-03-16 PROCEDURE — G0009 ADMIN PNEUMOCOCCAL VACCINE: HCPCS | Performed by: INTERNAL MEDICINE

## 2021-03-16 PROCEDURE — G0439 PPPS, SUBSEQ VISIT: HCPCS | Performed by: INTERNAL MEDICINE

## 2021-03-16 PROCEDURE — 3079F DIAST BP 80-89 MM HG: CPT | Performed by: INTERNAL MEDICINE

## 2021-03-16 PROCEDURE — 90670 PCV13 VACCINE IM: CPT | Performed by: INTERNAL MEDICINE

## 2021-03-16 PROCEDURE — 3008F BODY MASS INDEX DOCD: CPT | Performed by: INTERNAL MEDICINE

## 2021-03-16 PROCEDURE — 99397 PER PM REEVAL EST PAT 65+ YR: CPT | Performed by: INTERNAL MEDICINE

## 2021-03-16 PROCEDURE — 3074F SYST BP LT 130 MM HG: CPT | Performed by: INTERNAL MEDICINE

## 2021-03-16 PROCEDURE — 96160 PT-FOCUSED HLTH RISK ASSMT: CPT | Performed by: INTERNAL MEDICINE

## 2021-03-16 NOTE — PROGRESS NOTES
HPI:   Kenia Caldwell is a 73year old female who presents for a MA (Medicare Advantage) [facility][note 3] (Once per calendar year).   Patient comes for her Medicare annual wellness visit  Overall doing well and without any complaints    Pt comes for her medicare Face to Face with patient and Family/surrogate (if present), and forms available to patient in AVS       She smoked tobacco in the past but quit greater than 12 months ago.   Social History    Tobacco Use      Smoking status: Former Smoker        Packs/day: allergic to penicillins. CURRENT MEDICATIONS:   ALENDRONATE 70 MG Oral Tab, Take 1 tablet (70 mg total) by mouth once a week.  modafinil 100 MG Oral Tab, Take 1 tablet (100 mg total) by mouth daily. aspirin 81 MG Oral Tab, Take 81 mg by mouth daily. heartburn  : denies dysuria, vaginal discharge or itching, no complaint of urinary incontinence   MUSCULOSKELETAL: + chronic  back pain  NEURO: denies headaches  PSYCHE: denies depression or anxiety  HEMATOLOGIC: denies hx of anemia  ENDOCRINE: denies th Acuity                           Physical Exam  GENERAL: well developed, well nourished,in no apparent distress  SKIN: no rashes,no suspicious lesions  HEENT: atraumatic, normocephalic,ears and throat are clear, no frontal or maxillary sinus tenderness, pu without current pathological fracture  -     XR DEXA BONE DENSITOMETRY (CPT=77080);  Future  Ordered bone density  Abnormal Papanicolaou smear of cervix with positive human papilloma virus (HPV) test  Follow-up with GYN  Floater, vitreous, bilateral  And  A Annually No results found for: A1C No flowsheet data found.     Fasting Blood Sugar (FSB)Annually Glucose (mg/dL)   Date Value   01/04/2021 78          Cardiovascular Disease Screening     LDL Annually LDL Cholesterol (mg/dL)   Date Value   03/02/2020 140 ( Clients of institutions for the mentally retarded   Persons who live in the same house as a HepB virus carrier   Homosexual men   Illicit injectable drug abusers     Tetanus Toxoid  Only covered with a cut with metal- TD and TDaP Not covered by [facility]

## 2021-03-16 NOTE — PATIENT INSTRUCTIONS
Kusum Saavedra's SCREENING SCHEDULE   Tests on this list are recommended by your physician but may not be covered, or covered at this frequency, by your insurer. Please check with your insurance carrier before scheduling to verify coverage.    PREVENTATIVE abnormal Colonoscopy due on 07/02/2020 Update [facility] if applicable    Flex Sigmoidoscopy Screen  Covered every 5 years No results found for this or any previous visit. No flowsheet data found.      Fecal Occult Blood   Covered Annually No result No orders found for this or any previous visit. Please get once after your 65th birthday    Hepatitis B for Moderate/High Risk       No orders found for this or any previous visit.  Medium/high risk factors:   End-stage renal disease   Hemophiliacs who rece

## 2021-08-23 ENCOUNTER — HOSPITAL ENCOUNTER (OUTPATIENT)
Dept: BONE DENSITY | Age: 66
Discharge: HOME | End: 2021-08-23
Attending: INTERNAL MEDICINE
Payer: MEDICARE

## 2021-08-23 ENCOUNTER — HOSPITAL ENCOUNTER (OUTPATIENT)
Dept: MAMMOGRAPHY | Age: 66
Discharge: HOME | End: 2021-08-23
Attending: INTERNAL MEDICINE
Payer: MEDICARE

## 2021-08-23 DIAGNOSIS — M81.0 AGE-RELATED OSTEOPOROSIS WITHOUT CURRENT PATHOLOGICAL FRACTURE: ICD-10-CM

## 2021-08-23 DIAGNOSIS — Z12.31 SCREENING MAMMOGRAM, ENCOUNTER FOR: ICD-10-CM

## 2021-08-23 PROCEDURE — 77080 DXA BONE DENSITY AXIAL: CPT | Performed by: INTERNAL MEDICINE

## 2021-08-23 PROCEDURE — 77067 SCR MAMMO BI INCL CAD: CPT | Performed by: INTERNAL MEDICINE

## 2021-08-23 PROCEDURE — 77063 BREAST TOMOSYNTHESIS BI: CPT | Performed by: INTERNAL MEDICINE

## 2021-08-24 ENCOUNTER — HOSPITAL ENCOUNTER (OUTPATIENT)
Dept: ULTRASOUND IMAGING | Age: 66
Discharge: HOME | End: 2021-08-24
Attending: INTERNAL MEDICINE
Payer: MEDICARE

## 2021-08-24 DIAGNOSIS — M81.0 AGE-RELATED OSTEOPOROSIS WITHOUT CURRENT PATHOLOGICAL FRACTURE: ICD-10-CM

## 2021-08-24 DIAGNOSIS — E04.1 THYROID NODULE: ICD-10-CM

## 2021-08-24 PROCEDURE — 76536 US EXAM OF HEAD AND NECK: CPT | Performed by: INTERNAL MEDICINE

## 2021-08-25 RX ORDER — ALENDRONATE SODIUM 70 MG/1
70 TABLET ORAL WEEKLY
Qty: 12 TABLET | Refills: 0 | Status: SHIPPED | OUTPATIENT
Start: 2021-08-25

## 2021-08-25 NOTE — TELEPHONE ENCOUNTER
Refill passed per [place] [facility] protocol. Requested Prescriptions   Pending Prescriptions Disp Refills    alendronate 70 MG Oral Tab 12 tablet 0     Sig: Take 1 tablet (70 mg total) by mouth once a week.         Osteoporosis Medication Protocol Passed -

## 2021-10-05 ENCOUNTER — OFFICE VISIT (OUTPATIENT)
Dept: INTERNAL MEDICINE CLINIC | Facility: CLINIC | Age: 66
End: 2021-10-05
Payer: MEDICARE

## 2021-10-05 ENCOUNTER — LAB ENCOUNTER (OUTPATIENT)
Dept: LAB | Age: 66
End: 2021-10-05
Attending: INTERNAL MEDICINE
Payer: MEDICARE

## 2021-10-05 ENCOUNTER — MED REC SCAN ONLY (OUTPATIENT)
Dept: INTERNAL MEDICINE CLINIC | Facility: CLINIC | Age: 66
End: 2021-10-05

## 2021-10-05 VITALS
HEIGHT: 62.5 IN | DIASTOLIC BLOOD PRESSURE: 82 MMHG | HEART RATE: 67 BPM | SYSTOLIC BLOOD PRESSURE: 130 MMHG | BODY MASS INDEX: 31.76 KG/M2 | WEIGHT: 177 LBS | OXYGEN SATURATION: 98 % | TEMPERATURE: 97 F

## 2021-10-05 DIAGNOSIS — Z00.00 ENCOUNTER FOR ANNUAL HEALTH EXAMINATION: ICD-10-CM

## 2021-10-05 DIAGNOSIS — G89.29 CHRONIC MIDLINE LOW BACK PAIN WITHOUT SCIATICA: Primary | ICD-10-CM

## 2021-10-05 DIAGNOSIS — M54.50 CHRONIC MIDLINE LOW BACK PAIN WITHOUT SCIATICA: Primary | ICD-10-CM

## 2021-10-05 DIAGNOSIS — E04.1 THYROID NODULE: ICD-10-CM

## 2021-10-05 DIAGNOSIS — G35 MULTIPLE SCLEROSIS (HCC): ICD-10-CM

## 2021-10-05 DIAGNOSIS — M81.0 AGE-RELATED OSTEOPOROSIS WITHOUT CURRENT PATHOLOGICAL FRACTURE: ICD-10-CM

## 2021-10-05 DIAGNOSIS — Z23 NEED FOR VACCINATION: ICD-10-CM

## 2021-10-05 PROCEDURE — 99214 OFFICE O/P EST MOD 30 MIN: CPT | Performed by: INTERNAL MEDICINE

## 2021-10-05 PROCEDURE — 3008F BODY MASS INDEX DOCD: CPT | Performed by: INTERNAL MEDICINE

## 2021-10-05 PROCEDURE — 3079F DIAST BP 80-89 MM HG: CPT | Performed by: INTERNAL MEDICINE

## 2021-10-05 PROCEDURE — G0008 ADMIN INFLUENZA VIRUS VAC: HCPCS | Performed by: INTERNAL MEDICINE

## 2021-10-05 PROCEDURE — 80061 LIPID PANEL: CPT

## 2021-10-05 PROCEDURE — 3075F SYST BP GE 130 - 139MM HG: CPT | Performed by: INTERNAL MEDICINE

## 2021-10-05 PROCEDURE — 36415 COLL VENOUS BLD VENIPUNCTURE: CPT

## 2021-10-05 PROCEDURE — 90662 IIV NO PRSV INCREASED AG IM: CPT | Performed by: INTERNAL MEDICINE

## 2021-10-05 NOTE — PROGRESS NOTES
Syeda Santoro is a 73year old female. Patient presents with:   Follow - Up: 6 months      HPI:   PT comes for f/u  C/c f/u  C/o walking a mile a day    Brought her family history -   1-mother  at [facility] hysterectomy, thyroid removed, asthma and history mouth.       • Omega-3 Fatty Acids (FISH OIL OR) Take 1 capsule by mouth. • Multiple Vitamins-Minerals (MULTI-VITAMIN/MINERALS) Oral Tab Take 1 tablet by mouth daily.         Past Medical History:   Diagnosis Date   • Age-related nuclear cataract of b auscultation, no wheeze  CARDIO: RRR without murmur  GI: good BS's,no masses or tenderness  EXTREMITIES: no cyanosis, or edema    ASSESSMENT AND PLAN:   Diagnoses and all orders for this visit:    Chronic midline low back pain without sciatica  Can try Tyl

## 2022-01-21 ENCOUNTER — TELEPHONE (OUTPATIENT)
Dept: CASE MANAGEMENT | Age: 67
End: 2022-01-21

## 2025-03-28 ENCOUNTER — TELEPHONE (OUTPATIENT)
Dept: INTERNAL MEDICINE CLINIC | Facility: CLINIC | Age: 70
End: 2025-03-28

## 2025-03-31 ENCOUNTER — PATIENT OUTREACH (OUTPATIENT)
Dept: CASE MANAGEMENT | Age: 70
End: 2025-03-31

## 2025-03-31 NOTE — PROCEDURES
The office order for PCP removal request is Approved and finalized on March 31, 2025.    Removed Ivett Gerber MD as the patient's Primary Care Physician

## (undated) NOTE — LETTER
12/28/17        [address]      Dear Jim Chen,    [facility] records indicate that you have outstanding lab work and or testing that was ordered for you and has not yet been completed:          Lipid Panel      CMP

## (undated) NOTE — LETTER
Elder Fox  [address]        Dear Steven Cevallos: On behalf of your primary doctor Enedelia Winston MD, we have made multiple attempts to reach you by phone over the past several weeks.   Unfortunately, we have not hea

## (undated) NOTE — MR AVS SNAPSHOT
After Visit Summary   8/29/2019    Matthew Grubbs    MRN: [id number]           Visit Information     Date & Time  8/29/2019  1:20 PM Provider  Rhea Abrams MD [address] [place] [address], [address] [place] [address],[address], [employer]  Phone  [phone] 9/4/2019 2:30 PM Jarred, [facility]    10/17/2019 3:30 PM Catie Rodriguez [facility] Ophthalmology                [facility] now offers Video Visits through [address] for adult and pediatric patients.   Video Vis Conditions needing urgent attention, but are not life-threatening. Average cost  $120*       EMERGENCY ROOM         Life-threatening emergencies needing immediate intervention   at a hospital emergency room.       Average cost  $2,300*   *Cost varie

## (undated) NOTE — LETTER
October 17, 2019    Arnolshe Bonnie, [facility]     Patient: Aniyah Merino   YOB: 1955   Date of Visit: 10/17/2019       Dear Dr. Amador Dodd MD:    Thank you for referring Aniyah Merino to me for evaluation.  Here is my Medications:  modafinil 100 MG Oral Tab, Take 1 tablet (100 mg total) by mouth daily. , Disp: 90 tablet, Rfl: 1  aspirin 81 MG Oral Tab, Take 81 mg by mouth daily.   , Disp: , Rfl:   Calcium Carbonate-Vitamin D (CALTRATE 600+D OR), Take 1 tablet by mouth 2 ( Right Left    Disc Good rim, Temporal crescent, pallor  Good rim, Temporal crescent    C/D Ratio 0.35 0.35    Macula Normal Normal    Vessels Normal Normal    Periphery Normal Normal            Refraction     Wearing Rx       Sphere Cylinder Axis Add

## (undated) NOTE — LETTER
3/29/2019        Ray [place]  [address]  [place] [address]         Dear Jasmine Parisi,    On behalf of your primary physician Dr. Adelina Blunt, we have been unable to reach you by phone. We hope all is going well.      To provide you with the b